# Patient Record
Sex: FEMALE | Race: WHITE | NOT HISPANIC OR LATINO | Employment: FULL TIME | URBAN - METROPOLITAN AREA
[De-identification: names, ages, dates, MRNs, and addresses within clinical notes are randomized per-mention and may not be internally consistent; named-entity substitution may affect disease eponyms.]

---

## 2020-10-13 ENCOUNTER — OFFICE VISIT (OUTPATIENT)
Dept: OBSTETRICS AND GYNECOLOGY | Facility: CLINIC | Age: 30
End: 2020-10-13

## 2020-10-13 VITALS
HEIGHT: 68 IN | DIASTOLIC BLOOD PRESSURE: 80 MMHG | WEIGHT: 230.6 LBS | BODY MASS INDEX: 34.95 KG/M2 | SYSTOLIC BLOOD PRESSURE: 128 MMHG

## 2020-10-13 DIAGNOSIS — Z00.00 ANNUAL PHYSICAL EXAM: Primary | ICD-10-CM

## 2020-10-13 DIAGNOSIS — Z30.09 BIRTH CONTROL COUNSELING: ICD-10-CM

## 2020-10-13 DIAGNOSIS — Z71.6 ENCOUNTER FOR SMOKING CESSATION COUNSELING: ICD-10-CM

## 2020-10-13 PROCEDURE — 99385 PREV VISIT NEW AGE 18-39: CPT | Performed by: OBSTETRICS & GYNECOLOGY

## 2020-10-13 RX ORDER — DIPHENHYDRAMINE HCL 25 MG
25 CAPSULE ORAL EVERY 6 HOURS PRN
COMMUNITY
End: 2020-10-23

## 2020-10-13 NOTE — PROGRESS NOTES
"HPI   Obdulia Zafar  is a 30 y.o. female who presents for several reasons.  First, she would like to reestablish care and have a routine gynecologic exam.  It is been at least 4 years since her last visit.  Since then, she had a 27-week delivery of a baby who .  She has experienced depression as a result of this.  Currently, she is under treatment.  She has counseling and medication.  She is doing very well.  Denies suicidal or homicidal ideation.  Her depressive symptoms are under control.  She does have periods which are regular, predictable and very heavy.  She would like to discuss options for pregnancy prevention.  Also, she smokes 1/2 pack of cigarettes daily.    Chief Complaint   Patient presents with   • Gynecologic Exam     consult regarding \"getting tubes tied\";  last pap 2016       Past Medical History:   Diagnosis Date   • Anxiety    • Depression        Past Surgical History:   Procedure Laterality Date   •  SECTION     • D&C WITH SUCTION     • DILATATION AND CURETTAGE     • TONSILLECTOMY         Social History     Socioeconomic History   • Marital status: Single     Spouse name: Not on file   • Number of children: Not on file   • Years of education: Not on file   • Highest education level: Not on file   Tobacco Use   • Smoking status: Current Some Day Smoker     Types: Cigarettes   • Smokeless tobacco: Former User   Substance and Sexual Activity   • Alcohol use: Not Currently   • Drug use: Not Currently   • Sexual activity: Yes     Birth control/protection: None       The following portions of the patient's history were reviewed and updated as appropriate: allergies, current medications, past family history, past medical history, past social history, past surgical history and problem list.    Review of Systems  This is positive for menorrhagia and dysmenorrhea.  It is negative for dyspareunia.  It is negative for fever or chills.  Negative for nausea or vomiting.  Negative for itching or " sweats.  Negative for suicidal or homicidal ideation.  All other systems are reviewed and are negative.        Physical Exam  Vitals signs and nursing note reviewed.   Constitutional:       Appearance: She is well-developed.   HENT:      Head: Normocephalic and atraumatic.   Cardiovascular:      Rate and Rhythm: Normal rate and regular rhythm.   Pulmonary:      Effort: Pulmonary effort is normal.      Breath sounds: Normal breath sounds. No wheezing or rales.   Chest:      Comments: The breasts are homogeneous.  There are no palpable lumps.  Nipple discharge and axillary adenopathy are absent.  Abdominal:      General: There is no distension.      Palpations: Abdomen is soft.      Tenderness: There is no abdominal tenderness.   Genitourinary:     Labia:         Right: No lesion.         Left: No lesion.       Vagina: Normal. No vaginal discharge.      Cervix: No cervical motion tenderness.      Uterus: Normal. Not enlarged and not tender.       Adnexa:         Right: No mass or tenderness.          Left: No mass or tenderness.     Skin:     General: Skin is warm and dry.   Neurological:      Mental Status: She is alert and oriented to person, place, and time.         Assessment    Diagnoses and all orders for this visit:    1. Annual physical exam (Primary)    2. Birth control counseling    3. Encounter for smoking cessation counseling        Plan  1. Annual examination performed  2. Counseled regarding safe sex practices  3. The patient would like to discuss contraceptive options.  15 minutes of a 30-minute visit were spent in direct face-to-face counseling regarding options.  Initially, the patient wished to discuss laparoscopic tubal cautery.  We reviewed a diagram together and discussed the procedure itself as well as its risks, benefits and alternatives.  Ultimately, the patient felt that she would rather consider other alternatives because she already has menorrhagia and is worried that her cycle will become  heavier.  We discussed the use of Nexplanon, Mirena or Depo-Provera.  The patient would like to consider Mirena.  We discussed the procedure itself as well as its risks, benefits and alternatives.  I also gave the patient a brochure.  She would like to proceed.  She will arrange a Mirena in the near future.    4. No follow-ups on file.    Social History     Tobacco Use   Smoking Status Current Some Day Smoker   • Types: Cigarettes   5.

## 2020-10-16 LAB
CONV .: NORMAL
CYTOLOGIST CVX/VAG CYTO: NORMAL
CYTOLOGY CVX/VAG DOC CYTO: NORMAL
CYTOLOGY CVX/VAG DOC THIN PREP: NORMAL
DX ICD CODE: NORMAL
HIV 1 & 2 AB SER-IMP: NORMAL
Lab: NORMAL
OTHER STN SPEC: NORMAL
STAT OF ADQ CVX/VAG CYTO-IMP: NORMAL

## 2020-10-23 ENCOUNTER — OFFICE VISIT (OUTPATIENT)
Dept: OBSTETRICS AND GYNECOLOGY | Facility: CLINIC | Age: 30
End: 2020-10-23

## 2020-10-23 VITALS
HEIGHT: 68 IN | DIASTOLIC BLOOD PRESSURE: 82 MMHG | BODY MASS INDEX: 34.86 KG/M2 | SYSTOLIC BLOOD PRESSURE: 120 MMHG | WEIGHT: 230 LBS

## 2020-10-23 DIAGNOSIS — Z30.430 ENCOUNTER FOR INSERTION OF MIRENA IUD: Primary | ICD-10-CM

## 2020-10-23 LAB
B-HCG UR QL: NEGATIVE
INTERNAL NEGATIVE CONTROL: NEGATIVE
INTERNAL POSITIVE CONTROL: POSITIVE
Lab: NORMAL

## 2020-10-23 PROCEDURE — 81025 URINE PREGNANCY TEST: CPT | Performed by: OBSTETRICS & GYNECOLOGY

## 2020-10-23 PROCEDURE — 58300 INSERT INTRAUTERINE DEVICE: CPT | Performed by: OBSTETRICS & GYNECOLOGY

## 2020-10-23 RX ORDER — ESCITALOPRAM OXALATE 10 MG/1
TABLET ORAL
COMMUNITY
Start: 2020-10-15

## 2020-10-23 NOTE — PROGRESS NOTES
HPI   Obdulia Zafar  is a 30 y.o. female who presents for placement of a Mirena IUD she has been counseled regarding the benefits, risks and alternatives.  Her questions have been answered and she would like to proceed.    Chief Complaint   Patient presents with   • Follow-up     Mirena insertion        Past Medical History:   Diagnosis Date   • Anxiety    • Depression        Past Surgical History:   Procedure Laterality Date   •  SECTION     • D&C WITH SUCTION     • DILATATION AND CURETTAGE     • TONSILLECTOMY         Social History     Socioeconomic History   • Marital status: Single     Spouse name: Not on file   • Number of children: Not on file   • Years of education: Not on file   • Highest education level: Not on file   Tobacco Use   • Smoking status: Current Some Day Smoker     Types: Cigarettes   • Smokeless tobacco: Former User   Substance and Sexual Activity   • Alcohol use: Not Currently   • Drug use: Not Currently   • Sexual activity: Yes     Birth control/protection: None       The following portions of the patient's history were reviewed and updated as appropriate: allergies, current medications, past family history, past medical history, past social history, past surgical history and problem list.    Review of Systems          Physical Exam  Vitals signs and nursing note reviewed.   Constitutional:       Appearance: Normal appearance.   Genitourinary:     Comments: Mirena IUD was placed without difficulty.  Sterile technique was observed.  The uterus sounded to 8-1/2 cm.  The Mirena insertion device was used to place the Mirena.  The string was trimmed to the appropriate length.  The patient tolerated the procedure well.  Neurological:      Mental Status: She is alert and oriented to person, place, and time.         Assessment    Diagnoses and all orders for this visit:    1. Encounter for insertion of mirena IUD (Primary)  -     POC Pregnancy, Urine  -     US Non-ob  Transvaginal        Plan  1. Mirena IUD was placed as described above.  The patient tolerated the procedure well.    2. No follow-ups on file.    Social History     Tobacco Use   Smoking Status Current Some Day Smoker   • Types: Cigarettes   3.

## 2023-05-23 ENCOUNTER — TELEPHONE (OUTPATIENT)
Dept: OBSTETRICS AND GYNECOLOGY | Facility: CLINIC | Age: 33
End: 2023-05-23
Payer: COMMERCIAL

## 2023-05-23 NOTE — TELEPHONE ENCOUNTER
I have reviewed the patient's chart.  She had a quantitative hCG today which was 798.  Because this is less than the thousand, a pregnancy would not be visible on ultrasound.  I agree with the recommendation of the doctor who evaluated her at [place].  Specifically, please have the patient come in on Thursday the 25th for a quantitative hCG.  If this is doubling appropriately, this could represent a normal intrauterine pregnancy even with the IUD in place.  If it is behaving differently, this will give us information as well.  I will then be able to give better advice as to the next step in management.  If the patient has heavy bleeding or severe pain, I would advise her to come to [facility] emergency room.  Thank you.

## 2023-05-23 NOTE — TELEPHONE ENCOUNTER
Pt states she went to [facility] due to heavy bleeding. Pt says she has IUD, and was informed that she is pregnant. Pt says the could not tell if pregnancy is ectopic or not, she states she last had intercourse about 5-6 weeks ago and is still bleeding/in pain.     How would you prefer this pt be scheduled (new ob?) and would you like to see this pt soon?    Please advise,   Thank you

## 2023-05-23 NOTE — TELEPHONE ENCOUNTER
Please obtain the records from the [place] emergency room visit.  They are not in care everywhere.  This is important because an ultrasound was most likely performed.  Please let me know when that record is available..  We can then determine if the patient needs to go to the emergency room at [facility] [place] today or if she can be seen later this week.  Thank you

## 2023-05-23 NOTE — TELEPHONE ENCOUNTER
[facility] records have been scanned into patient's chart and are now under media. Please let me know what you would like for me to advise pt to do

## 2023-06-14 ENCOUNTER — TELEPHONE (OUTPATIENT)
Dept: OBSTETRICS AND GYNECOLOGY | Facility: CLINIC | Age: 33
End: 2023-06-14
Payer: COMMERCIAL

## 2023-06-14 NOTE — TELEPHONE ENCOUNTER
----- Message from Abraham Murphy MD sent at 6/12/2023 12:31 PM EDT -----  She was to return for a repeat quantitative hCG.  She has not done so and has not responded to attempts to reach her by phone.  Please continue to attempt to contact her, as she has a positive pregnancy test with an IUD in place.  She will need a repeat hCG as soon as possible.  Also, her IUD should be removed.  If you are not able to reach her by phone, please send a certified letter.  Thank you.

## 2023-06-15 DIAGNOSIS — O20.9 BLEEDING IN EARLY PREGNANCY: Primary | ICD-10-CM

## 2024-06-18 ENCOUNTER — OFFICE VISIT (OUTPATIENT)
Dept: OBSTETRICS AND GYNECOLOGY | Facility: CLINIC | Age: 34
End: 2024-06-18
Payer: COMMERCIAL

## 2024-06-18 VITALS — DIASTOLIC BLOOD PRESSURE: 78 MMHG | BODY MASS INDEX: 38.77 KG/M2 | SYSTOLIC BLOOD PRESSURE: 122 MMHG | WEIGHT: 255 LBS

## 2024-06-18 DIAGNOSIS — F32.A DEPRESSION, UNSPECIFIED DEPRESSION TYPE: ICD-10-CM

## 2024-06-18 DIAGNOSIS — Z30.011 VISIT FOR ORAL CONTRACEPTIVE PRESCRIPTION: ICD-10-CM

## 2024-06-18 DIAGNOSIS — Z30.432 ENCOUNTER FOR IUD REMOVAL: Primary | ICD-10-CM

## 2024-06-18 PROCEDURE — 1159F MED LIST DOCD IN RCRD: CPT | Performed by: OBSTETRICS & GYNECOLOGY

## 2024-06-18 PROCEDURE — 99204 OFFICE O/P NEW MOD 45 MIN: CPT | Performed by: OBSTETRICS & GYNECOLOGY

## 2024-06-18 PROCEDURE — 1160F RVW MEDS BY RX/DR IN RCRD: CPT | Performed by: OBSTETRICS & GYNECOLOGY

## 2024-06-18 RX ORDER — NORETHINDRONE ACETATE AND ETHINYL ESTRADIOL AND FERROUS FUMARATE 1MG-20(24)
1 KIT ORAL DAILY
Qty: 28 TABLET | Refills: 12 | Status: SHIPPED | OUTPATIENT
Start: 2024-06-18 | End: 2025-06-18

## 2024-06-18 RX ORDER — VENLAFAXINE HYDROCHLORIDE 37.5 MG/1
37.5 CAPSULE, EXTENDED RELEASE ORAL DAILY
Qty: 30 CAPSULE | Refills: 11 | Status: SHIPPED | OUTPATIENT
Start: 2024-06-18

## 2024-06-18 NOTE — PROGRESS NOTES
MARISA Zafar  is a 33 y.o. female who presents for several reasons.  First, she would like to have her IUD removed.  The patient was counseled regarding the procedure and elected to proceed.  Next, she would like to use oral contraceptive pills in place of her IUD.  We discussed the benefits and risks of this and answered the patient's questions.  Next, the patient continues to have problems with depression since the loss of her son.  She has helplessness and hopelessness as well as decreased energy.  She tried Lexapro this was not successful.  She is not currently taking any medication.  She tried counseling but was unsatisfied with it.    Chief Complaint   Patient presents with    Follow-up     Iud removal        Past Medical History:   Diagnosis Date    Anxiety     Depression        Past Surgical History:   Procedure Laterality Date     SECTION      D & C WITH SUCTION      DILATATION AND CURETTAGE      TONSILLECTOMY         Social History     Socioeconomic History    Marital status: Single   Tobacco Use    Smoking status: Some Days     Types: Cigarettes    Smokeless tobacco: Former   Substance and Sexual Activity    Alcohol use: Not Currently    Drug use: Not Currently    Sexual activity: Yes     Birth control/protection: None       The following portions of the patient's history were reviewed and updated as appropriate: allergies, current medications, past family history, past medical history, past social history, past surgical history and problem list.    Review of Systems     This is positive for feelings of helplessness and hopelessness.  It is positive for decreased energy levels.  It is negative for suicidal or homicidal ideation.  It is negative for fever or chills.  Negative for nausea or vomiting.    Physical Exam  Vitals and nursing note reviewed.   Constitutional:       Appearance: Normal appearance.   Abdominal:      General: There is no distension.      Palpations: Abdomen is soft.       Tenderness: There is no abdominal tenderness.   Genitourinary:     Comments: Normal female external genitalia  The vagina is estrogenized.  The posterior wall of the vagina is pushed into the vagina due to stool in the rectum.  The cervix is normal in appearance.  Normal female external genitalia  The vagina is estrogenized.  The posterior wall of the vagina is pushed into the vagina due to stool in the rectum.  The cervix is normal in appearance.  The uterus is mobile within the pelvis.  It is nontender.  No adnexal masses are palpable.        IUD string is visible at the cervix.  This is grasped with a ring forceps and the IUD is removed.  It is intact.  The patient tolerated the procedure well.  Neurological:      Mental Status: She is alert and oriented to person, place, and time.         Assessment    Diagnoses and all orders for this visit:    1. Encounter for IUD removal (Primary)    2. Visit for oral contraceptive prescription  -     HCG, B-subunit, Quantitative    3. Depression, unspecified depression type    Other orders  -     norethindrone-ethinyl estradiol-ferrous fumarate (LOESTIN 24 FE) 1-20 MG-MCG(24) per tablet; Take 1 tablet by mouth Daily.  Dispense: 28 tablet; Refill: 12  -     venlafaxine XR (Effexor XR) 37.5 MG 24 hr capsule; Take 1 capsule by mouth Daily.  Dispense: 30 capsule; Refill: 11        Plan  Mirena IUD was removed as described above.  The patient tolerated the procedure well.  Contraceptive counseling.  The patient would like to start an oral contraceptive pill.  A prescription for a low-dose contraceptive was sent and the patient has been counseled regarding its proper use.  The patient appears to have had a miscarriage last year with her IUD in place.  She had a quantitative hCG of 117.  The patient reports that her levels were falling, but these levels were not available for my review.  I recommend a quantitative hCG today to confirm that it has reached 0 and the patient  agrees.  Depression.  Counseled and questions answered.  We discussed treatment options for depression.  The patient does not wish to restart counseling, but would like to start medicine.  She has tried Zoloft in the past and Lexapro in the past and was not satisfied with either 1.  I recommend Effexor at 37.5 mg daily.  Follow-up in 6 weeks to assess effectiveness.  The patient agrees with this recommendation.  40 minutes were spent in direct face-to-face counseling regarding the issues listed above.    Return in about 6 weeks (around 7/30/2024).    Social History     Tobacco Use   Smoking Status Some Days    Types: Cigarettes   Smokeless Tobacco Former

## 2024-08-01 ENCOUNTER — TELEPHONE (OUTPATIENT)
Dept: OBSTETRICS AND GYNECOLOGY | Facility: CLINIC | Age: 34
End: 2024-08-01
Payer: COMMERCIAL